# Patient Record
Sex: FEMALE | Race: WHITE | NOT HISPANIC OR LATINO
[De-identification: names, ages, dates, MRNs, and addresses within clinical notes are randomized per-mention and may not be internally consistent; named-entity substitution may affect disease eponyms.]

---

## 2020-07-14 ENCOUNTER — LABORATORY RESULT (OUTPATIENT)
Age: 40
End: 2020-07-14

## 2020-07-14 PROBLEM — Z00.00 ENCOUNTER FOR PREVENTIVE HEALTH EXAMINATION: Status: ACTIVE | Noted: 2020-07-14

## 2020-07-16 ENCOUNTER — APPOINTMENT (OUTPATIENT)
Dept: PULMONOLOGY | Facility: CLINIC | Age: 40
End: 2020-07-16
Payer: COMMERCIAL

## 2020-07-16 VITALS
HEIGHT: 69 IN | BODY MASS INDEX: 18.96 KG/M2 | HEART RATE: 117 BPM | WEIGHT: 128 LBS | OXYGEN SATURATION: 98 % | DIASTOLIC BLOOD PRESSURE: 84 MMHG | SYSTOLIC BLOOD PRESSURE: 120 MMHG | TEMPERATURE: 97.7 F

## 2020-07-16 DIAGNOSIS — Z82.5 FAMILY HISTORY OF ASTHMA AND OTHER CHRONIC LOWER RESPIRATORY DISEASES: ICD-10-CM

## 2020-07-16 DIAGNOSIS — Z86.59 PERSONAL HISTORY OF OTHER MENTAL AND BEHAVIORAL DISORDERS: ICD-10-CM

## 2020-07-16 DIAGNOSIS — Z87.2 PERSONAL HISTORY OF DISEASES OF THE SKIN AND SUBCUTANEOUS TISSUE: ICD-10-CM

## 2020-07-16 PROCEDURE — 94010 BREATHING CAPACITY TEST: CPT

## 2020-07-16 PROCEDURE — 71046 X-RAY EXAM CHEST 2 VIEWS: CPT

## 2020-07-16 PROCEDURE — 95012 NITRIC OXIDE EXP GAS DETER: CPT

## 2020-07-16 PROCEDURE — 99204 OFFICE O/P NEW MOD 45 MIN: CPT | Mod: 25

## 2020-07-16 RX ORDER — FLUTICASONE PROPIONATE AND SALMETEROL 50; 250 UG/1; UG/1
250-50 POWDER RESPIRATORY (INHALATION)
Refills: 0 | Status: ACTIVE | COMMUNITY

## 2020-07-16 RX ORDER — MONTELUKAST 10 MG/1
10 TABLET, FILM COATED ORAL
Refills: 0 | Status: ACTIVE | COMMUNITY

## 2020-07-16 NOTE — ASSESSMENT
[FreeTextEntry1] : Data reviewed:\par \par PA/lat CXR in office 07/16/2020 : clear lungs, mild scoliosis\par \par Raymundo 07/16/2020 : normal / FENO <5\par \par Impression:\par Dyspnea - post infectious symptoms, functional dyspnea\par \par Plan:\par Some of what she is describing is functional/psychogenic dyspnea. She may have some post-infectious symptomatology. I focused today on reassurance based on normal evaluation noted above, and urged her to return to getting some physical activity (ie she is avoiding stairs at work, but she is well enough to resume climbing stairs). Having labs next week w PMD.

## 2020-07-16 NOTE — CONSULT LETTER
[Dear  ___] : Dear  [unfilled], [( Thank you for referring [unfilled] for consultation for _____ )] : Thank you for referring [unfilled] for consultation for [unfilled] [Sincerely,] : Sincerely, [Consult Closing:] : Thank you very much for allowing me to participate in the care of this patient.  If you have any questions, please do not hesitate to contact me. [Please see my note below.] : Please see my note below. [FreeTextEntry2] : Axel Haley MD\par [address] \[place] [address] [FreeTextEntry3] : Ana Maria Fernando MD, FCCP\par

## 2020-07-16 NOTE — HISTORY OF PRESENT ILLNESS
[TextBox_4] : 07/16/2020: Asked to evaluate patient by Dr Haley for dyspnea. Stated hx of asthma. Diagnosed w pneumonia on March 5 by CXR at a [facility]. Given Abx and cleared up. Was able to walk a mile and a half home from work. At baseline is very active, can run 20 min. 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 Had sore throat, dyspnea and fatigue, anosmia and loss of taste. No fever. Went to an ED but was not tested for Covid, was told she was having a panic attack. At one point woke up unable to breathe, too afraid to call 911 - very tearful describing this event. Subsequently went to [facility] again, CXR clear. This dyspnea went on for 3 weeks. Eventually her doc gave her Abx again for no specific dx and this seemed to improve her breathing for a month. Then ACP gave her Advair and she started to produce copious mucus w chest pain. She is still producing mucus though not so much. But then in May had an episode of dizziness, dyspnea, went to [facility] [place], had CT with no findings. ("I do have anxiety but this is not anxiety.") After this they reduced her Advair to once a day. She's been basically stable since this time, slowly improving, but still experiencing dyspnea and chest pressure. Right now she is having chest pressure. She is still producing sputum. She can't get a deep breath in even with yawning; "I will try and try." Right now is on montelukast at night and Advair 250/50 once daily. Never smoker, no inhalational exposures.\par

## 2020-07-16 NOTE — PHYSICAL EXAM
[No Acute Distress] : no acute distress [Normal Appearance] : normal appearance [Normal Rate/Rhythm] : normal rate/rhythm [No Murmurs] : no murmurs [No Resp Distress] : no resp distress [Normal S1, S2] : normal s1, s2 [Clear to Auscultation Bilaterally] : clear to auscultation bilaterally [No Edema] : no edema [No Clubbing] : no clubbing [Benign] : benign [Normal Color/ Pigmentation] : normal color/ pigmentation [TextBox_140] : anxious